# Patient Record
Sex: MALE | Race: WHITE | NOT HISPANIC OR LATINO | Employment: OTHER | URBAN - METROPOLITAN AREA
[De-identification: names, ages, dates, MRNs, and addresses within clinical notes are randomized per-mention and may not be internally consistent; named-entity substitution may affect disease eponyms.]

---

## 2022-01-07 ENCOUNTER — HISTORICAL (OUTPATIENT)
Dept: ADMINISTRATIVE | Facility: HOSPITAL | Age: 72
End: 2022-01-07

## 2022-01-07 LAB — POC CREATININE: 1.3 MG/DL (ref 0.6–1.3)

## 2022-12-30 ENCOUNTER — LAB REQUISITION (OUTPATIENT)
Dept: LAB | Facility: HOSPITAL | Age: 72
End: 2022-12-30
Payer: MEDICARE

## 2022-12-30 DIAGNOSIS — N39.0 URINARY TRACT INFECTION, SITE NOT SPECIFIED: ICD-10-CM

## 2022-12-30 PROCEDURE — 87088 URINE BACTERIA CULTURE: CPT | Performed by: NURSE PRACTITIONER

## 2023-01-01 LAB — BACTERIA UR CULT: NO GROWTH

## 2023-10-17 DIAGNOSIS — D36.9 TUBULOVILLOUS ADENOMA: Primary | ICD-10-CM

## 2023-10-25 ENCOUNTER — DOCUMENTATION ONLY (OUTPATIENT)
Dept: SURGICAL ONCOLOGY | Facility: CLINIC | Age: 73
End: 2023-10-25
Payer: MEDICARE

## 2023-10-25 LAB — CRC RECOMMENDATION EXT: NORMAL

## 2023-10-26 ENCOUNTER — OFFICE VISIT (OUTPATIENT)
Dept: SURGICAL ONCOLOGY | Facility: CLINIC | Age: 73
End: 2023-10-26
Payer: MEDICARE

## 2023-10-26 VITALS — WEIGHT: 223 LBS | HEIGHT: 68 IN | BODY MASS INDEX: 33.8 KG/M2

## 2023-10-26 DIAGNOSIS — D36.9 TUBULOVILLOUS ADENOMA: Primary | ICD-10-CM

## 2023-10-26 DIAGNOSIS — D36.9 TUBULOVILLOUS ADENOMA: ICD-10-CM

## 2023-10-26 PROCEDURE — 1160F PR REVIEW ALL MEDS BY PRESCRIBER/CLIN PHARMACIST DOCUMENTED: ICD-10-PCS | Mod: CPTII,S$GLB,, | Performed by: COLON & RECTAL SURGERY

## 2023-10-26 PROCEDURE — 1159F MED LIST DOCD IN RCRD: CPT | Mod: CPTII,S$GLB,, | Performed by: COLON & RECTAL SURGERY

## 2023-10-26 PROCEDURE — 3008F BODY MASS INDEX DOCD: CPT | Mod: CPTII,S$GLB,, | Performed by: COLON & RECTAL SURGERY

## 2023-10-26 PROCEDURE — 1159F PR MEDICATION LIST DOCUMENTED IN MEDICAL RECORD: ICD-10-PCS | Mod: CPTII,S$GLB,, | Performed by: COLON & RECTAL SURGERY

## 2023-10-26 PROCEDURE — 4010F PR ACE/ARB THEARPY RXD/TAKEN: ICD-10-PCS | Mod: CPTII,S$GLB,, | Performed by: COLON & RECTAL SURGERY

## 2023-10-26 PROCEDURE — 1101F PT FALLS ASSESS-DOCD LE1/YR: CPT | Mod: CPTII,S$GLB,, | Performed by: COLON & RECTAL SURGERY

## 2023-10-26 PROCEDURE — 99205 PR OFFICE/OUTPT VISIT, NEW, LEVL V, 60-74 MIN: ICD-10-PCS | Mod: S$GLB,,, | Performed by: COLON & RECTAL SURGERY

## 2023-10-26 PROCEDURE — 3288F PR FALLS RISK ASSESSMENT DOCUMENTED: ICD-10-PCS | Mod: CPTII,S$GLB,, | Performed by: COLON & RECTAL SURGERY

## 2023-10-26 PROCEDURE — 1160F RVW MEDS BY RX/DR IN RCRD: CPT | Mod: CPTII,S$GLB,, | Performed by: COLON & RECTAL SURGERY

## 2023-10-26 PROCEDURE — 99205 OFFICE O/P NEW HI 60 MIN: CPT | Mod: S$GLB,,, | Performed by: COLON & RECTAL SURGERY

## 2023-10-26 PROCEDURE — 3008F PR BODY MASS INDEX (BMI) DOCUMENTED: ICD-10-PCS | Mod: CPTII,S$GLB,, | Performed by: COLON & RECTAL SURGERY

## 2023-10-26 PROCEDURE — 3288F FALL RISK ASSESSMENT DOCD: CPT | Mod: CPTII,S$GLB,, | Performed by: COLON & RECTAL SURGERY

## 2023-10-26 PROCEDURE — 4010F ACE/ARB THERAPY RXD/TAKEN: CPT | Mod: CPTII,S$GLB,, | Performed by: COLON & RECTAL SURGERY

## 2023-10-26 PROCEDURE — 99999 PR PBB SHADOW E&M-EST. PATIENT-LVL III: ICD-10-PCS | Mod: PBBFAC,,, | Performed by: COLON & RECTAL SURGERY

## 2023-10-26 PROCEDURE — 1101F PR PT FALLS ASSESS DOC 0-1 FALLS W/OUT INJ PAST YR: ICD-10-PCS | Mod: CPTII,S$GLB,, | Performed by: COLON & RECTAL SURGERY

## 2023-10-26 PROCEDURE — 99999 PR PBB SHADOW E&M-EST. PATIENT-LVL III: CPT | Mod: PBBFAC,,, | Performed by: COLON & RECTAL SURGERY

## 2023-10-26 RX ORDER — TAMSULOSIN HYDROCHLORIDE 0.4 MG/1
1 CAPSULE ORAL DAILY
COMMUNITY
Start: 2023-10-12

## 2023-10-26 RX ORDER — GABAPENTIN 100 MG/1
200 CAPSULE ORAL
Status: CANCELLED | OUTPATIENT
Start: 2023-10-26

## 2023-10-26 RX ORDER — METHYLPREDNISOLONE 4 MG/1
TABLET ORAL
COMMUNITY
Start: 2023-08-16

## 2023-10-26 RX ORDER — NEOMYCIN SULFATE 500 MG/1
1000 TABLET ORAL 3 TIMES DAILY
Qty: 6 TABLET | Refills: 0 | Status: SHIPPED | OUTPATIENT
Start: 2023-10-26 | End: 2023-11-10 | Stop reason: SDUPTHER

## 2023-10-26 RX ORDER — SODIUM CHLORIDE, SODIUM LACTATE, POTASSIUM CHLORIDE, CALCIUM CHLORIDE 600; 310; 30; 20 MG/100ML; MG/100ML; MG/100ML; MG/100ML
INJECTION, SOLUTION INTRAVENOUS CONTINUOUS
Status: CANCELLED | OUTPATIENT
Start: 2023-10-26

## 2023-10-26 RX ORDER — LAMOTRIGINE 100 MG/1
100 TABLET ORAL DAILY
COMMUNITY
Start: 2023-09-09

## 2023-10-26 RX ORDER — LIDOCAINE HYDROCHLORIDE 10 MG/ML
1 INJECTION, SOLUTION EPIDURAL; INFILTRATION; INTRACAUDAL; PERINEURAL ONCE
Status: CANCELLED | OUTPATIENT
Start: 2023-10-26 | End: 2023-10-26

## 2023-10-26 RX ORDER — FEBUXOSTAT 40 MG/1
40 TABLET, FILM COATED ORAL
COMMUNITY

## 2023-10-26 RX ORDER — DULOXETIN HYDROCHLORIDE 60 MG/1
60 CAPSULE, DELAYED RELEASE ORAL DAILY
COMMUNITY
Start: 2023-09-09

## 2023-10-26 RX ORDER — ENALAPRIL MALEATE 20 MG/1
20 TABLET ORAL DAILY
COMMUNITY
Start: 2023-09-09

## 2023-10-26 RX ORDER — ACETAMINOPHEN 500 MG
1000 TABLET ORAL
Status: CANCELLED | OUTPATIENT
Start: 2023-10-26 | End: 2023-10-26

## 2023-10-26 RX ORDER — ENOXAPARIN SODIUM 300 MG/3ML
40 INJECTION INTRAVENOUS; SUBCUTANEOUS EVERY 24 HOURS
Status: CANCELLED | OUTPATIENT
Start: 2023-10-26

## 2023-10-26 RX ORDER — DULOXETIN HYDROCHLORIDE 30 MG/1
30 CAPSULE, DELAYED RELEASE ORAL DAILY
COMMUNITY
Start: 2023-09-09

## 2023-10-26 RX ORDER — CELECOXIB 100 MG/1
400 CAPSULE ORAL
Status: CANCELLED | OUTPATIENT
Start: 2023-10-26 | End: 2023-10-26

## 2023-10-26 RX ORDER — METRONIDAZOLE 500 MG/1
500 TABLET ORAL 3 TIMES DAILY
Qty: 3 TABLET | Refills: 0 | Status: ON HOLD | OUTPATIENT
Start: 2023-10-26 | End: 2023-11-14

## 2023-10-26 RX ORDER — METRONIDAZOLE 500 MG/100ML
500 INJECTION, SOLUTION INTRAVENOUS
Status: CANCELLED | OUTPATIENT
Start: 2023-10-26

## 2023-10-27 NOTE — PROGRESS NOTES
"   Patient ID: [id number]     Chief Complaint: tubulovillous adenoma of cecum (New pt)      HPI:     Kevin Hurley is a 73 y.o. male here today for an initial consultation.  He was referred by . Kelby Black for management of a colon polyp.  He has a history of colon polyps so he underwent surveillance colonoscopy 09/27/2023 that revealed a 4-5 cm nodular, carpet like polyp covering 2 folds in the cecum.  The polyp could not be removed endoscopically so biopsy was performed.  Pathology shows tubulovillous adenoma.  He denies abdominal pain, constipation, diarrhea, blood in his stool, and weight loss.    Past Medical History:  has a past medical history of Depression.    Surgical History:  has a past surgical history that includes Appendectomy and Rotator cuff repair (Right).    Family History: family history includes Cancer in his father.    Social History:  reports that he has never smoked. He has never used smokeless tobacco. He reports current alcohol use.    Current Outpatient Medications   Medication Instructions    DULoxetine (CYMBALTA) 60 mg, Oral    DULoxetine (CYMBALTA) 30 mg, Oral    enalapril (VASOTEC) 20 mg, Oral    febuxostat (ULORIC) 40 mg, Oral    lamoTRIgine (LAMICTAL) 100 mg, Oral, 2 times daily    methylPREDNISolone (MEDROL DOSEPACK) 4 mg tablet Oral    metroNIDAZOLE (FLAGYL) 500 mg, Oral, 3 times daily    neomycin (MYCIFRADIN) 1,000 mg, Oral, 3 times daily    tamsulosin (FLOMAX) 0.4 mg Cap 1 capsule, Oral       Patient is allergic to allopurinol analogues.     Patient Care Team:  Jez Christopher MD as PCP - General (Family Medicine)       Subjective:     Review of Systems    12 point review of systems conducted, negative except as stated in the history of present illness. See HPI for details.      Objective:     Visit Vitals  Ht 5' 8" (1.727 m)   Wt 101.2 kg (223 lb)   BMI 33.91 kg/m²       Physical Exam    General: Alert and oriented, No acute distress.  Head: Normocephalic, " "Atraumatic.  Eye: Pupils are equal and round, Extraocular movements are intact, Sclera non-icteric.  Ears/Nose/Throat: Normal, Mucosa moist, Clear.  Respiratory: No wheezes, Non-labored respirations, Symmetrical chest wall expansion.  Cardiovascular: Regular rate.  Gastrointestinal: Soft, Non-tender, Non-distended, Normal bowel sounds, No palpable masses.  Integumentary: Warm, Dry, Intact, No rashes.  Extremities: No edema.  Neurologic: No focal deficits.  Psychiatric: Normal interaction, Coherent speech, Euthymic mood, Appropriate affect.       Labs Reviewed:     Chemistry:  No results found for: "NA", "K", "CHLORIDE", "BUN", "CREATININE", "EGFRNORACEVR", "GLUCOSE", "CALCIUM", "ALKPHOS", "LABPROT", "ALBUMIN", "BILIDIR", "IBILI", "AST", "ALT", "MG", "PHOS", "CEA"     Hematology:  No results found for: "WBC", "HGB", "HCT", "PLT"      Assessment:       ICD-10-CM ICD-9-CM   1. Tubulovillous adenoma  D36.9 229.9        Plan:   I explained the colonoscopic and pathologic findings with the patient as well as the reason for surgery.  I also explained the surgery, potential adverse events, and expected recovery.  Schedule laparoscopic right colectomy 11/14/2023.      No follow-ups on file. In addition to their scheduled follow up, the patient has also been instructed to follow up on as needed basis.     Future Appointments   Date Time Provider Department Center   11/30/2023 11:30 AM Kevin Montesinos MD [facility]        Kevin Montesinos MD    "

## 2023-10-27 NOTE — H&P (VIEW-ONLY)
"   Patient ID: [id number]     Chief Complaint: tubulovillous adenoma of cecum (New pt)      HPI:     Kevin Hurley is a 73 y.o. male here today for an initial consultation.  He was referred by . Kelby Black for management of a colon polyp.  He has a history of colon polyps so he underwent surveillance colonoscopy 09/27/2023 that revealed a 4-5 cm nodular, carpet like polyp covering 2 folds in the cecum.  The polyp could not be removed endoscopically so biopsy was performed.  Pathology shows tubulovillous adenoma.  He denies abdominal pain, constipation, diarrhea, blood in his stool, and weight loss.    Past Medical History:  has a past medical history of Depression.    Surgical History:  has a past surgical history that includes Appendectomy and Rotator cuff repair (Right).    Family History: family history includes Cancer in his father.    Social History:  reports that he has never smoked. He has never used smokeless tobacco. He reports current alcohol use.    Current Outpatient Medications   Medication Instructions    DULoxetine (CYMBALTA) 60 mg, Oral    DULoxetine (CYMBALTA) 30 mg, Oral    enalapril (VASOTEC) 20 mg, Oral    febuxostat (ULORIC) 40 mg, Oral    lamoTRIgine (LAMICTAL) 100 mg, Oral, 2 times daily    methylPREDNISolone (MEDROL DOSEPACK) 4 mg tablet Oral    metroNIDAZOLE (FLAGYL) 500 mg, Oral, 3 times daily    neomycin (MYCIFRADIN) 1,000 mg, Oral, 3 times daily    tamsulosin (FLOMAX) 0.4 mg Cap 1 capsule, Oral       Patient is allergic to allopurinol analogues.     Patient Care Team:  Jez Christopher MD as PCP - General (Family Medicine)       Subjective:     Review of Systems    12 point review of systems conducted, negative except as stated in the history of present illness. See HPI for details.      Objective:     Visit Vitals  Ht 5' 8" (1.727 m)   Wt 101.2 kg (223 lb)   BMI 33.91 kg/m²       Physical Exam    General: Alert and oriented, No acute distress.  Head: Normocephalic, " "Atraumatic.  Eye: Pupils are equal and round, Extraocular movements are intact, Sclera non-icteric.  Ears/Nose/Throat: Normal, Mucosa moist, Clear.  Respiratory: No wheezes, Non-labored respirations, Symmetrical chest wall expansion.  Cardiovascular: Regular rate.  Gastrointestinal: Soft, Non-tender, Non-distended, Normal bowel sounds, No palpable masses.  Integumentary: Warm, Dry, Intact, No rashes.  Extremities: No edema.  Neurologic: No focal deficits.  Psychiatric: Normal interaction, Coherent speech, Euthymic mood, Appropriate affect.       Labs Reviewed:     Chemistry:  No results found for: "NA", "K", "CHLORIDE", "BUN", "CREATININE", "EGFRNORACEVR", "GLUCOSE", "CALCIUM", "ALKPHOS", "LABPROT", "ALBUMIN", "BILIDIR", "IBILI", "AST", "ALT", "MG", "PHOS", "CEA"     Hematology:  No results found for: "WBC", "HGB", "HCT", "PLT"      Assessment:       ICD-10-CM ICD-9-CM   1. Tubulovillous adenoma  D36.9 229.9        Plan:   I explained the colonoscopic and pathologic findings with the patient as well as the reason for surgery.  I also explained the surgery, potential adverse events, and expected recovery.  Schedule laparoscopic right colectomy 11/14/2023.      No follow-ups on file. In addition to their scheduled follow up, the patient has also been instructed to follow up on as needed basis.     Future Appointments   Date Time Provider Department Center   11/30/2023 11:30 AM Kevin Montesinos MD [facility]        Kevin Montesinos MD    "

## 2023-11-06 ENCOUNTER — HOSPITAL ENCOUNTER (OUTPATIENT)
Dept: RADIOLOGY | Facility: HOSPITAL | Age: 73
Discharge: HOME | End: 2023-11-06
Attending: COLON & RECTAL SURGERY
Payer: MEDICARE

## 2023-11-06 DIAGNOSIS — D36.9 TUBULOVILLOUS ADENOMA: ICD-10-CM

## 2023-11-06 PROCEDURE — 71046 X-RAY EXAM CHEST 2 VIEWS: CPT | Mod: TC

## 2023-11-10 DIAGNOSIS — D36.9 TUBULOVILLOUS ADENOMA: ICD-10-CM

## 2023-11-10 RX ORDER — NEOMYCIN SULFATE 500 MG/1
1000 TABLET ORAL 3 TIMES DAILY
Qty: 6 TABLET | Refills: 0 | Status: ON HOLD | OUTPATIENT
Start: 2023-11-10 | End: 2023-11-14

## 2023-11-13 ENCOUNTER — ANESTHESIA EVENT (OUTPATIENT)
Dept: SURGERY | Facility: HOSPITAL | Age: 73
DRG: 331 | End: 2023-11-13
Payer: MEDICARE

## 2023-11-13 NOTE — PRE-PROCEDURE INSTRUCTIONS
[facility]: Outpatient Surgery   Preprocedure Check-In Instructions       Your arrival time for your surgery or procedure is 5:00am.     We ask patients to arrive about 2 hours before surgery to allow for enough time to review your health history & medications, start your IV, complete any outstanding labwork or tests, and meet your Anesthesiologist.     You will arrive at [facility], [address] [place] [address]. Enter through the [place] entrance next to the Emergency Room, and come to the 6th floor to the Outpatient Surgery Department. If you need a wheelchair, please call [phone] for an attendant to meet you at the [place] entrance with a wheelchair.    Wait Times:  Due to inconsistent procedure completion times, an unexpected wait may occur.  The physicians would like you to be here in the event they run ahead of time.  We will keep you comfortable and informed while you are waiting.  We apologize in advance if this happens.    Visitory Policy:   You are allowed 2 adult visitors to be with you in the hospital. All hospital visitors should be in good current health. No small children.     What to Bring:   Please have your ID, insurance cards, and all home medication bottles with you at check in. Bring your CPAP machine if one is used at home.     Fasting:   Nothing to eat or drink after midnight the night before your procedure. This includes no ice, gum, hard candies, and/or tobacco products.   Follow your doctor's instructions for taking any medications on the morning of your procedure. If no instructions for taking medications were given, do not take any medications but bring your medications in their bottles to your procedure check in.     Follow your doctor's preoperative instructions regarding skin prep, bowel prep, bathing, or showering prior to your procedure. If any special soaps were provided to you, please use according to your doctor's instructions.  If no instructions were given from your doctor, take a good bath or shower with antibacterial soap the night before and the morning of your procedure. On the morning of procedure, wear loose, comfortable clothing. No lotions, makeup, perfumes, colognes, deodorant, or jewelry to your procedure. Removable items (glasses, contact lenses, dentures, retainers, hearing aids) need to be removed for your procedure. Bring your storage containers for these items if you wear them.     Artificial nails, body jewelry, eyelash extensions, and/or hair extensions with metal clips are not allowed during your surgery. If you currently wear any of these items, please arrange for them to be removed prior to your arrival to the hospital.     Outpatient or Same Day Surgeries:   Any patients receiving sedation/anesthesia are advised not to drive for 24 hours after their procedure. We do not allow patients to drive themselves home after discharge. If you are going home after your procedure, please have someone available to drive you home from the hospital.     You may call the Outpatient Surgery Department at [phone] with any questions or concerns. We are looking forward to meeting you and taking great care of you for your procedure. Thank you for choosing [facility] for your surgical needs.       Status: complete  Spoke with: patient  Call Time: [id number]

## 2023-11-14 ENCOUNTER — ANESTHESIA (OUTPATIENT)
Dept: SURGERY | Facility: HOSPITAL | Age: 73
DRG: 331 | End: 2023-11-14
Payer: MEDICARE

## 2023-11-14 ENCOUNTER — HOSPITAL ENCOUNTER (INPATIENT)
Facility: HOSPITAL | Age: 73
LOS: 2 days | Discharge: HOME | DRG: 331 | End: 2023-11-16
Attending: COLON & RECTAL SURGERY | Admitting: COLON & RECTAL SURGERY
Payer: MEDICARE

## 2023-11-14 DIAGNOSIS — D36.9 TUBULOVILLOUS ADENOMA: ICD-10-CM

## 2023-11-14 DIAGNOSIS — K63.5 CECAL POLYP: Primary | ICD-10-CM

## 2023-11-14 LAB
GROUP & RH: NORMAL
INDIRECT COOMBS GEL: NORMAL
SPECIMEN OUTDATE: NORMAL

## 2023-11-14 PROCEDURE — 25000003 PHARM REV CODE 250: Performed by: ANESTHESIOLOGY

## 2023-11-14 PROCEDURE — 63600175 PHARM REV CODE 636 W HCPCS: Performed by: ANESTHESIOLOGY

## 2023-11-14 PROCEDURE — 11000001 HC ACUTE MED/SURG PRIVATE ROOM

## 2023-11-14 PROCEDURE — 63600175 PHARM REV CODE 636 W HCPCS: Performed by: COLON & RECTAL SURGERY

## 2023-11-14 PROCEDURE — 36000711: Performed by: COLON & RECTAL SURGERY

## 2023-11-14 PROCEDURE — 37000008 HC ANESTHESIA 1ST 15 MINUTES: Performed by: COLON & RECTAL SURGERY

## 2023-11-14 PROCEDURE — 71000033 HC RECOVERY, INTIAL HOUR: Performed by: COLON & RECTAL SURGERY

## 2023-11-14 PROCEDURE — D9220A PRA ANESTHESIA: ICD-10-PCS | Mod: CRNA,,, | Performed by: NURSE ANESTHETIST, CERTIFIED REGISTERED

## 2023-11-14 PROCEDURE — D9220A PRA ANESTHESIA: Mod: ANES,,, | Performed by: ANESTHESIOLOGY

## 2023-11-14 PROCEDURE — C9290 INJ, BUPIVACAINE LIPOSOME: HCPCS | Performed by: COLON & RECTAL SURGERY

## 2023-11-14 PROCEDURE — 25000003 PHARM REV CODE 250: Performed by: COLON & RECTAL SURGERY

## 2023-11-14 PROCEDURE — 44205 PR LAP,SURG,COLECTOMY,W/REMVL TERM ILEUM: ICD-10-PCS | Mod: ,,, | Performed by: COLON & RECTAL SURGERY

## 2023-11-14 PROCEDURE — 88307 TISSUE EXAM BY PATHOLOGIST: CPT | Performed by: COLON & RECTAL SURGERY

## 2023-11-14 PROCEDURE — D9220A PRA ANESTHESIA: ICD-10-PCS | Mod: ANES,,, | Performed by: ANESTHESIOLOGY

## 2023-11-14 PROCEDURE — 44205 LAP COLECTOMY PART W/ILEUM: CPT | Mod: ,,, | Performed by: COLON & RECTAL SURGERY

## 2023-11-14 PROCEDURE — 71000039 HC RECOVERY, EACH ADD'L HOUR: Performed by: COLON & RECTAL SURGERY

## 2023-11-14 PROCEDURE — 37000009 HC ANESTHESIA EA ADD 15 MINS: Performed by: COLON & RECTAL SURGERY

## 2023-11-14 PROCEDURE — 71000015 HC POSTOP RECOV 1ST HR: Performed by: COLON & RECTAL SURGERY

## 2023-11-14 PROCEDURE — 27000221 HC OXYGEN, UP TO 24 HOURS

## 2023-11-14 PROCEDURE — 25000003 PHARM REV CODE 250: Performed by: NURSE ANESTHETIST, CERTIFIED REGISTERED

## 2023-11-14 PROCEDURE — D9220A PRA ANESTHESIA: Mod: CRNA,,, | Performed by: NURSE ANESTHETIST, CERTIFIED REGISTERED

## 2023-11-14 PROCEDURE — 36000710: Performed by: COLON & RECTAL SURGERY

## 2023-11-14 PROCEDURE — 27201423 OPTIME MED/SURG SUP & DEVICES STERILE SUPPLY: Performed by: COLON & RECTAL SURGERY

## 2023-11-14 PROCEDURE — 63600175 PHARM REV CODE 636 W HCPCS: Performed by: NURSE ANESTHETIST, CERTIFIED REGISTERED

## 2023-11-14 PROCEDURE — 86850 RBC ANTIBODY SCREEN: CPT | Performed by: COLON & RECTAL SURGERY

## 2023-11-14 RX ORDER — ENOXAPARIN SODIUM 100 MG/ML
40 INJECTION SUBCUTANEOUS EVERY 24 HOURS
Status: DISCONTINUED | OUTPATIENT
Start: 2023-11-14 | End: 2023-11-14

## 2023-11-14 RX ORDER — HYDROMORPHONE HYDROCHLORIDE 2 MG/ML
INJECTION, SOLUTION INTRAMUSCULAR; INTRAVENOUS; SUBCUTANEOUS
Status: DISCONTINUED | OUTPATIENT
Start: 2023-11-14 | End: 2023-11-14

## 2023-11-14 RX ORDER — DEXAMETHASONE SODIUM PHOSPHATE 4 MG/ML
INJECTION, SOLUTION INTRA-ARTICULAR; INTRALESIONAL; INTRAMUSCULAR; INTRAVENOUS; SOFT TISSUE
Status: DISCONTINUED | OUTPATIENT
Start: 2023-11-14 | End: 2023-11-14

## 2023-11-14 RX ORDER — LABETALOL HYDROCHLORIDE 5 MG/ML
INJECTION, SOLUTION INTRAVENOUS ONCE
Status: CANCELLED | OUTPATIENT
Start: 2023-11-14 | End: 2023-11-14

## 2023-11-14 RX ORDER — PROCHLORPERAZINE EDISYLATE 5 MG/ML
5 INJECTION INTRAMUSCULAR; INTRAVENOUS EVERY 6 HOURS PRN
Status: DISCONTINUED | OUTPATIENT
Start: 2023-11-14 | End: 2023-11-16 | Stop reason: HOSPADM

## 2023-11-14 RX ORDER — MUPIROCIN 20 MG/G
OINTMENT TOPICAL 2 TIMES DAILY
Status: DISCONTINUED | OUTPATIENT
Start: 2023-11-14 | End: 2023-11-16 | Stop reason: HOSPADM

## 2023-11-14 RX ORDER — FENTANYL CITRATE 50 UG/ML
INJECTION, SOLUTION INTRAMUSCULAR; INTRAVENOUS
Status: DISCONTINUED | OUTPATIENT
Start: 2023-11-14 | End: 2023-11-14

## 2023-11-14 RX ORDER — ONDANSETRON 2 MG/ML
INJECTION INTRAMUSCULAR; INTRAVENOUS
Status: DISCONTINUED | OUTPATIENT
Start: 2023-11-14 | End: 2023-11-14

## 2023-11-14 RX ORDER — DULOXETIN HYDROCHLORIDE 30 MG/1
60 CAPSULE, DELAYED RELEASE ORAL DAILY
Status: DISCONTINUED | OUTPATIENT
Start: 2023-11-14 | End: 2023-11-16 | Stop reason: HOSPADM

## 2023-11-14 RX ORDER — SODIUM CHLORIDE, SODIUM LACTATE, POTASSIUM CHLORIDE, CALCIUM CHLORIDE 600; 310; 30; 20 MG/100ML; MG/100ML; MG/100ML; MG/100ML
INJECTION, SOLUTION INTRAVENOUS CONTINUOUS
Status: DISCONTINUED | OUTPATIENT
Start: 2023-11-14 | End: 2023-11-14

## 2023-11-14 RX ORDER — METOCLOPRAMIDE HYDROCHLORIDE 5 MG/ML
10 INJECTION INTRAMUSCULAR; INTRAVENOUS EVERY 10 MIN PRN
Status: DISCONTINUED | OUTPATIENT
Start: 2023-11-14 | End: 2023-11-14 | Stop reason: HOSPADM

## 2023-11-14 RX ORDER — HYDROMORPHONE HYDROCHLORIDE 2 MG/ML
0.4 INJECTION, SOLUTION INTRAMUSCULAR; INTRAVENOUS; SUBCUTANEOUS EVERY 5 MIN PRN
Status: DISCONTINUED | OUTPATIENT
Start: 2023-11-14 | End: 2023-11-14 | Stop reason: HOSPADM

## 2023-11-14 RX ORDER — MIDAZOLAM HYDROCHLORIDE 1 MG/ML
INJECTION INTRAMUSCULAR; INTRAVENOUS
Status: DISCONTINUED | OUTPATIENT
Start: 2023-11-14 | End: 2023-11-14

## 2023-11-14 RX ORDER — SODIUM CHLORIDE, SODIUM LACTATE, POTASSIUM CHLORIDE, CALCIUM CHLORIDE 600; 310; 30; 20 MG/100ML; MG/100ML; MG/100ML; MG/100ML
INJECTION, SOLUTION INTRAVENOUS CONTINUOUS
Status: DISCONTINUED | OUTPATIENT
Start: 2023-11-14 | End: 2023-11-16

## 2023-11-14 RX ORDER — MIDAZOLAM HYDROCHLORIDE 1 MG/ML
2 INJECTION INTRAMUSCULAR; INTRAVENOUS ONCE AS NEEDED
Status: CANCELLED | OUTPATIENT
Start: 2023-11-14 | End: 2035-04-11

## 2023-11-14 RX ORDER — GLYCOPYRROLATE 0.2 MG/ML
INJECTION INTRAMUSCULAR; INTRAVENOUS
Status: DISCONTINUED | OUTPATIENT
Start: 2023-11-14 | End: 2023-11-14

## 2023-11-14 RX ORDER — BUPIVACAINE HYDROCHLORIDE 5 MG/ML
INJECTION, SOLUTION EPIDURAL; INTRACAUDAL
Status: DISCONTINUED | OUTPATIENT
Start: 2023-11-14 | End: 2023-11-14 | Stop reason: HOSPADM

## 2023-11-14 RX ORDER — LIDOCAINE HYDROCHLORIDE 10 MG/ML
1 INJECTION, SOLUTION EPIDURAL; INFILTRATION; INTRACAUDAL; PERINEURAL ONCE
Status: CANCELLED | OUTPATIENT
Start: 2023-11-14 | End: 2023-11-14

## 2023-11-14 RX ORDER — PROPOFOL 10 MG/ML
VIAL (ML) INTRAVENOUS
Status: DISCONTINUED | OUTPATIENT
Start: 2023-11-14 | End: 2023-11-14

## 2023-11-14 RX ORDER — ONDANSETRON 2 MG/ML
4 INJECTION INTRAMUSCULAR; INTRAVENOUS DAILY PRN
Status: DISCONTINUED | OUTPATIENT
Start: 2023-11-14 | End: 2023-11-14 | Stop reason: HOSPADM

## 2023-11-14 RX ORDER — GABAPENTIN 100 MG/1
200 CAPSULE ORAL
Status: COMPLETED | OUTPATIENT
Start: 2023-11-14 | End: 2023-11-14

## 2023-11-14 RX ORDER — LIDOCAINE HYDROCHLORIDE 10 MG/ML
1 INJECTION, SOLUTION EPIDURAL; INFILTRATION; INTRACAUDAL; PERINEURAL ONCE
Status: DISCONTINUED | OUTPATIENT
Start: 2023-11-14 | End: 2023-11-14

## 2023-11-14 RX ORDER — OXYCODONE HYDROCHLORIDE 5 MG/1
5 TABLET ORAL EVERY 4 HOURS PRN
Status: DISCONTINUED | OUTPATIENT
Start: 2023-11-14 | End: 2023-11-16 | Stop reason: HOSPADM

## 2023-11-14 RX ORDER — KETOROLAC TROMETHAMINE 30 MG/ML
15 INJECTION, SOLUTION INTRAMUSCULAR; INTRAVENOUS EVERY 6 HOURS
Status: DISCONTINUED | OUTPATIENT
Start: 2023-11-14 | End: 2023-11-16 | Stop reason: HOSPADM

## 2023-11-14 RX ORDER — PHENYLEPHRINE HYDROCHLORIDE 10 MG/ML
INJECTION INTRAVENOUS
Status: DISCONTINUED | OUTPATIENT
Start: 2023-11-14 | End: 2023-11-14

## 2023-11-14 RX ORDER — DIPHENHYDRAMINE HYDROCHLORIDE 50 MG/ML
25 INJECTION INTRAMUSCULAR; INTRAVENOUS EVERY 6 HOURS PRN
Status: DISCONTINUED | OUTPATIENT
Start: 2023-11-14 | End: 2023-11-14 | Stop reason: HOSPADM

## 2023-11-14 RX ORDER — ONDANSETRON 4 MG/1
4 TABLET, ORALLY DISINTEGRATING ORAL ONCE
Status: COMPLETED | OUTPATIENT
Start: 2023-11-14 | End: 2023-11-14

## 2023-11-14 RX ORDER — ONDANSETRON 2 MG/ML
4 INJECTION INTRAMUSCULAR; INTRAVENOUS EVERY 8 HOURS PRN
Status: DISCONTINUED | OUTPATIENT
Start: 2023-11-14 | End: 2023-11-16 | Stop reason: HOSPADM

## 2023-11-14 RX ORDER — SODIUM CHLORIDE, SODIUM GLUCONATE, SODIUM ACETATE, POTASSIUM CHLORIDE AND MAGNESIUM CHLORIDE 30; 37; 368; 526; 502 MG/100ML; MG/100ML; MG/100ML; MG/100ML; MG/100ML
INJECTION, SOLUTION INTRAVENOUS CONTINUOUS
Status: DISCONTINUED | OUTPATIENT
Start: 2023-11-14 | End: 2023-11-14

## 2023-11-14 RX ORDER — CELECOXIB 200 MG/1
400 CAPSULE ORAL
Status: COMPLETED | OUTPATIENT
Start: 2023-11-14 | End: 2023-11-14

## 2023-11-14 RX ORDER — ROCURONIUM BROMIDE 10 MG/ML
INJECTION, SOLUTION INTRAVENOUS
Status: DISCONTINUED | OUTPATIENT
Start: 2023-11-14 | End: 2023-11-14

## 2023-11-14 RX ORDER — TAMSULOSIN HYDROCHLORIDE 0.4 MG/1
1 CAPSULE ORAL DAILY
Status: DISCONTINUED | OUTPATIENT
Start: 2023-11-14 | End: 2023-11-16 | Stop reason: HOSPADM

## 2023-11-14 RX ORDER — LABETALOL HYDROCHLORIDE 5 MG/ML
10 INJECTION, SOLUTION INTRAVENOUS EVERY 4 HOURS PRN
Status: DISCONTINUED | OUTPATIENT
Start: 2023-11-14 | End: 2023-11-16 | Stop reason: HOSPADM

## 2023-11-14 RX ORDER — GABAPENTIN 300 MG/1
300 CAPSULE ORAL
Status: CANCELLED | OUTPATIENT
Start: 2023-11-14 | End: 2023-11-14

## 2023-11-14 RX ORDER — ACETAMINOPHEN 500 MG
1000 TABLET ORAL
Status: CANCELLED | OUTPATIENT
Start: 2023-11-14 | End: 2023-11-14

## 2023-11-14 RX ORDER — MORPHINE SULFATE 10 MG/ML
2 INJECTION INTRAMUSCULAR; INTRAVENOUS; SUBCUTANEOUS EVERY 4 HOURS PRN
Status: DISCONTINUED | OUTPATIENT
Start: 2023-11-14 | End: 2023-11-16 | Stop reason: HOSPADM

## 2023-11-14 RX ORDER — LAMOTRIGINE 100 MG/1
100 TABLET ORAL DAILY
Status: DISCONTINUED | OUTPATIENT
Start: 2023-11-14 | End: 2023-11-16 | Stop reason: HOSPADM

## 2023-11-14 RX ORDER — ACETAMINOPHEN 500 MG
1000 TABLET ORAL
Status: COMPLETED | OUTPATIENT
Start: 2023-11-14 | End: 2023-11-14

## 2023-11-14 RX ADMIN — HYDROMORPHONE HYDROCHLORIDE 0.4 MG: 2 INJECTION, SOLUTION INTRAMUSCULAR; INTRAVENOUS; SUBCUTANEOUS at 09:11

## 2023-11-14 RX ADMIN — PHENYLEPHRINE HYDROCHLORIDE 100 MCG: 10 INJECTION INTRAVENOUS at 08:11

## 2023-11-14 RX ADMIN — SODIUM CHLORIDE, SODIUM GLUCONATE, SODIUM ACETATE, POTASSIUM CHLORIDE AND MAGNESIUM CHLORIDE: 526; 502; 368; 37; 30 INJECTION, SOLUTION INTRAVENOUS at 07:11

## 2023-11-14 RX ADMIN — DEXAMETHASONE SODIUM PHOSPHATE 8 MG: 4 INJECTION, SOLUTION INTRA-ARTICULAR; INTRALESIONAL; INTRAMUSCULAR; INTRAVENOUS; SOFT TISSUE at 07:11

## 2023-11-14 RX ADMIN — CELECOXIB 400 MG: 200 CAPSULE ORAL at 06:11

## 2023-11-14 RX ADMIN — GLYCOPYRROLATE 0.1 MG: 0.2 INJECTION INTRAMUSCULAR; INTRAVENOUS at 07:11

## 2023-11-14 RX ADMIN — KETOROLAC TROMETHAMINE 15 MG: 30 INJECTION, SOLUTION INTRAMUSCULAR; INTRAVENOUS at 05:11

## 2023-11-14 RX ADMIN — KETOROLAC TROMETHAMINE 15 MG: 30 INJECTION, SOLUTION INTRAMUSCULAR; INTRAVENOUS at 01:11

## 2023-11-14 RX ADMIN — FENTANYL CITRATE 100 MCG: 50 INJECTION, SOLUTION INTRAMUSCULAR; INTRAVENOUS at 07:11

## 2023-11-14 RX ADMIN — SODIUM CHLORIDE, POTASSIUM CHLORIDE, SODIUM LACTATE AND CALCIUM CHLORIDE: 600; 310; 30; 20 INJECTION, SOLUTION INTRAVENOUS at 04:11

## 2023-11-14 RX ADMIN — ONDANSETRON 4 MG: 2 INJECTION INTRAMUSCULAR; INTRAVENOUS at 08:11

## 2023-11-14 RX ADMIN — CEFTRIAXONE SODIUM 2 G: 2 INJECTION, POWDER, FOR SOLUTION INTRAMUSCULAR; INTRAVENOUS at 07:11

## 2023-11-14 RX ADMIN — ONDANSETRON 4 MG: 4 TABLET, ORALLY DISINTEGRATING ORAL at 06:11

## 2023-11-14 RX ADMIN — SODIUM CHLORIDE, POTASSIUM CHLORIDE, SODIUM LACTATE AND CALCIUM CHLORIDE: 600; 310; 30; 20 INJECTION, SOLUTION INTRAVENOUS at 05:11

## 2023-11-14 RX ADMIN — LABETALOL HYDROCHLORIDE 10 MG: 5 INJECTION, SOLUTION INTRAVENOUS at 05:11

## 2023-11-14 RX ADMIN — HYDROMORPHONE HYDROCHLORIDE 0.4 MG: 2 INJECTION INTRAMUSCULAR; INTRAVENOUS; SUBCUTANEOUS at 10:11

## 2023-11-14 RX ADMIN — ENOXAPARIN SODIUM 40 MG: 40 INJECTION SUBCUTANEOUS at 06:11

## 2023-11-14 RX ADMIN — HYDROMORPHONE HYDROCHLORIDE 0.4 MG: 2 INJECTION INTRAMUSCULAR; INTRAVENOUS; SUBCUTANEOUS at 11:11

## 2023-11-14 RX ADMIN — ACETAMINOPHEN 1000 MG: 500 TABLET, FILM COATED ORAL at 06:11

## 2023-11-14 RX ADMIN — ROCURONIUM BROMIDE 80 MG: 10 SOLUTION INTRAVENOUS at 07:11

## 2023-11-14 RX ADMIN — MIDAZOLAM HYDROCHLORIDE 2 MG: 1 INJECTION, SOLUTION INTRAMUSCULAR; INTRAVENOUS at 07:11

## 2023-11-14 RX ADMIN — SUGAMMADEX 200 MG: 100 INJECTION, SOLUTION INTRAVENOUS at 09:11

## 2023-11-14 RX ADMIN — PROPOFOL 200 MG: 10 INJECTION, EMULSION INTRAVENOUS at 07:11

## 2023-11-14 RX ADMIN — MUPIROCIN: 20 OINTMENT TOPICAL at 08:11

## 2023-11-14 RX ADMIN — GABAPENTIN 200 MG: 100 CAPSULE ORAL at 06:11

## 2023-11-14 RX ADMIN — KETOROLAC TROMETHAMINE 15 MG: 30 INJECTION, SOLUTION INTRAMUSCULAR; INTRAVENOUS at 11:11

## 2023-11-14 NOTE — NURSING
Nurses Note -- 4 Eyes      11/14/2023   5:32 PM      Skin assessed during: Admit      [] No Altered Skin Integrity Present    []Prevention Measures Documented      [] Yes- Altered Skin Integrity Present or Discovered   [] LDA Added if Not in Epic (Describe Wound)   [] New Altered Skin Integrity was Present on Admit and Documented in LDA   [] Wound Image Taken    Wound Care Consulted? No    Attending Nurse:  Erma king lpn      Second RN/Staff Member:   juanita ospina lpn

## 2023-11-14 NOTE — ANESTHESIA PROCEDURE NOTES
Intubation    Date/Time: 11/14/2023 7:27 AM    Performed by: Marlon Naranjo CRNA  Authorized by: Miguel Angel Carter MD    Intubation:     Induction:  Intravenous    Intubated:  Postinduction    Mask Ventilation:  Easy with oral airway    Attempts:  1    Attempted By:  CRNA    Method of Intubation:  Direct    Blade:  Talbert 2    Laryngeal View Grade: Grade IIA - cords partially seen      Difficult Airway Encountered?: No      Complications:  None    Airway Device:  Oral endotracheal tube    Airway Device Size:  7.5    Style/Cuff Inflation:  Cuffed    Tube secured:  21    Secured at:  The lips    Placement Verified By:  Capnometry    Complicating Factors:  None    Findings Post-Intubation:  BS equal bilateral

## 2023-11-14 NOTE — ANESTHESIA PREPROCEDURE EVALUATION
11/14/2023  Kevin Hurley is a 73 y.o., male who presents with Colon Cncer, Tubulovilllous adenoma.      Diagnosis: Tubulovillous adenoma [D36.9]   Pre-op diagnosis: Tubulovillous adenoma [D36.9         The pt. comes to [facility] for the noted procedure under  GETA with 2nd peripheral IV.  Procedure: COLECTOMY, RIGHT, LAPAROSCOPIC (Abdomen)   Anesthesia type: General       PMHx:  Other Medical History   Depression Obesity   Tubulovillous adenoma Hypertension   Arthritis History of prostate cancer     Surgical History:  APPENDECTOMY ROTATOR CUFF REPAIR   COLONOSCOPY          Vital signs:        Lab Data:      EKG:          Pre-op Assessment    I have reviewed the Patient Summary Reports.     I have reviewed the Nursing Notes. I have reviewed the NPO Status.   I have reviewed the Medications.     Review of Systems  Anesthesia Hx:  No problems with previous Anesthesia                Social:  Non-Smoker       Hematology/Oncology:  Hematology Normal   Oncology Normal                                   EENT/Dental:  EENT/Dental Normal           Cardiovascular:  Exercise tolerance: good   Hypertension                Functional Capacity good / => 4 METS                         Pulmonary:  Pulmonary Normal                       Renal/:  Renal/ Normal                 Hepatic/GI:  Hepatic/GI Normal                 Musculoskeletal:  Musculoskeletal Normal                Neurological:  Neurology Normal                                      Endocrine:  Endocrine Normal            Dermatological:  Skin Normal    Psych:  Psychiatric History  depression                Physical Exam  General: Alert, Oriented, Well nourished and Cooperative    Airway:  Mallampati: II   Mouth Opening: Normal  TM Distance: Normal  Tongue: Normal  Neck ROM: Normal ROM    Dental:  Intact    Chest/Lungs:  Clear to auscultation, Normal Respiratory  Rate    Heart:  Rate: Normal  Rhythm: Regular Rhythm        Anesthesia Plan  Type of Anesthesia, risks & benefits discussed:    Anesthesia Type: Gen ETT  Intra-op Monitoring Plan: Standard ASA Monitors  Post Op Pain Control Plan: IV/PO Opioids PRN  Induction:  IV and Inhalation  Airway Plan: Direct  Informed Consent: Informed consent signed with the Patient and all parties understand the risks and agree with anesthesia plan.  All questions answered. Patient consented to blood products? Yes  ASA Score: 3  Day of Surgery Review of History & Physical: H&P Update referred to the surgeon/provider.  Anesthesia Plan Notes: '2 peripheral IV's    Ready For Surgery From Anesthesia Perspective.     .

## 2023-11-14 NOTE — TRANSFER OF CARE
"Anesthesia Transfer of Care Note    Patient: Kevin Hurley    Procedure(s) Performed: Procedure(s) (LRB):  COLECTOMY, RIGHT, LAPAROSCOPIC (N/A)    Patient location: PACU    Anesthesia Type: general    Transport from OR: Transported from OR on room air with adequate spontaneous ventilation    Post pain: adequate analgesia    Post assessment: no apparent anesthetic complications    Post vital signs: stable    Level of consciousness: sedated    Nausea/Vomiting: no nausea/vomiting    Complications: none    Transfer of care protocol was followed      Last vitals: Visit Vitals  BP (!) 163/85   Pulse 67   Temp 36.5 °C (97.7 °F) (Oral)   Resp 20   Ht 5' 8" (1.727 m)   Wt 101.2 kg (223 lb)   SpO2 96%   BMI 33.91 kg/m²     "

## 2023-11-14 NOTE — OP NOTE
[facility] - Periop Services  Operative Note      Date of Procedure: 11/14/2023     Procedure: Procedure(s) (LRB):  COLECTOMY, RIGHT, LAPAROSCOPIC (N/A)     Surgeon(s) and Role:     * Kevin Montesinos MD - Primary    Assisting Surgeon: None    Pre-Operative Diagnosis: Tubulovillous adenoma [D36.9]    Post-Operative Diagnosis: Same    Anesthesia: General    Estimated Blood Loss (EBL): 50 mL           Specimens: Right colon     Findings:  Curative intent - yes  Tumor location - cecum  Extent of vascular resection - ileocolic    Description of Technical Procedures:  After informed consent was obtained, patient was brought to the operating room and placed in the supine position.  Next general endotracheal anesthesia was administered by the anesthesia team.  The abdomen was prepped and draped in sterile surgical fashion.  A small vertical supraumbilical incision made with a 15 blade.  Access to the peritoneal cavity was achieved with an 11 mm Optiview trocar and a 10 mm 0 degree laparoscope.  Pneumoperitoneum was achieved.  Three additional working trocars were placed.  Omentum was then reflected cephalad.  Patient was placed in left-side-down position.  Cecum was grasped retracted towards the anterior abdominal wall thus delineating the ileocolic vascular pedicle.  The peritoneum was incised parallel to the pedicle and the right colon was mobilized in a medial to lateral fashion.  Duodenum was identified and kept out of harm's way.  The pedicle was skeletonized and isolated.  It was then sharply divided using a 60 mm echelon stapler with a white cartridge.  The mesentery was then divided up to the proximal transverse colon.  Omentum was incised off of the transverse colon.  The hepatic flexure was taken down with sharp dissection using Harmonic scalpel.  Attention was then turned to the cecum where the right colon was mobilized by incising the lateral attachment working along the white line of Toldt.   The medial dissection was then joined, and the right colon was completely mobilized.  Cecum was grasped with a laparoscopic grasper.  The supraumbilical incision was extended a short distance with a 15 blade.  Incision was deepened electrocautery.  The fascia was divided vertically in the midline and a medium Fabrizio wound edge protector was utilized.  The terminal ileum and right colon were then easily delivered into the wound.  Terminal ileum was divided approximally 5 cm proximal to the ileocecal valve using the Wh[place]any stapler with a blue cartridge.  Proximal transverse colon was divided similarly.  Remaining mesentery was divided with Harmonic scalpel.  The right colon was then sent for permanent pathology.  Then a side-to-side, functional end-to-end ileocolostomy was created using a 75 mm TLC stapler with blue cartridges.  The anastomosis was reinforced with interrupted 2-0 Vicryl Lembert suture.  A widely patent anastomosis was palpable upon completion.  It was returned to an intra-abdominal position.  Pneumoperitoneum was reachieved.  The anastomosis was properly oriented and tension free.  Bowels were covered with omentum.  Pneumoperitoneum was released and working trocars were removed.  The Fabrizio was removed and we changed gloves.  The midline fascial defect was repaired with running looped PDS suture.  Skin edges were reapproximated with Monocryl suture in a subcuticular fashion.  Incisions were cleaned and sterile bandages applied.  Patient tolerated procedure well and there were no complications.  Patient was awaken and extubated in the operating room then subsequently transferred to recovery in satisfactory condition.

## 2023-11-14 NOTE — INTERVAL H&P NOTE
The patient has been examined and the H&P has been reviewed:    I concur with the findings and no changes have occurred since H&P was written.    Surgery risks, benefits and alternative options discussed and understood by patient/family.          Active Hospital Problems    Diagnosis  POA    *Cecal polyp [K63.5]  Yes      Resolved Hospital Problems   No resolved problems to display.

## 2023-11-14 NOTE — NURSING
Pt arrived to room, family at side times one hr 110 bp 158/99 notified md jimenes     Pt is noted to be in stable condition with no s/s of hypertension     Dressings and lab sites to abd times four are noted to be dry and intact with steri strips in place     No pain reported at this time

## 2023-11-15 LAB
ANION GAP SERPL CALC-SCNC: 6 MEQ/L
BASOPHILS # BLD AUTO: 0.01 X10(3)/MCL
BASOPHILS NFR BLD AUTO: 0.1 %
BUN SERPL-MCNC: 18.6 MG/DL (ref 8.4–25.7)
CALCIUM SERPL-MCNC: 8.4 MG/DL (ref 8.8–10)
CHLORIDE SERPL-SCNC: 104 MMOL/L (ref 98–107)
CO2 SERPL-SCNC: 26 MMOL/L (ref 23–31)
CREAT SERPL-MCNC: 1.39 MG/DL (ref 0.73–1.18)
CREAT/UREA NIT SERPL: 13
EOSINOPHIL # BLD AUTO: 0.01 X10(3)/MCL (ref 0–0.9)
EOSINOPHIL NFR BLD AUTO: 0.1 %
ERYTHROCYTE [DISTWIDTH] IN BLOOD BY AUTOMATED COUNT: 12.1 % (ref 11.5–17)
GFR SERPLBLD CREATININE-BSD FMLA CKD-EPI: 53 MLS/MIN/1.73/M2
GLUCOSE SERPL-MCNC: 118 MG/DL (ref 82–115)
HCT VFR BLD AUTO: 34.4 % (ref 42–52)
HGB BLD-MCNC: 12.5 G/DL (ref 14–18)
IMM GRANULOCYTES # BLD AUTO: 0.03 X10(3)/MCL (ref 0–0.04)
IMM GRANULOCYTES NFR BLD AUTO: 0.3 %
LYMPHOCYTES # BLD AUTO: 0.85 X10(3)/MCL (ref 0.6–4.6)
LYMPHOCYTES NFR BLD AUTO: 9.4 %
MCH RBC QN AUTO: 35.6 PG (ref 27–31)
MCHC RBC AUTO-ENTMCNC: 36.3 G/DL (ref 33–36)
MCV RBC AUTO: 98 FL (ref 80–94)
MONOCYTES # BLD AUTO: 1.03 X10(3)/MCL (ref 0.1–1.3)
MONOCYTES NFR BLD AUTO: 11.4 %
NEUTROPHILS # BLD AUTO: 7.12 X10(3)/MCL (ref 2.1–9.2)
NEUTROPHILS NFR BLD AUTO: 78.7 %
NRBC BLD AUTO-RTO: 0 %
PLATELET # BLD AUTO: 148 X10(3)/MCL (ref 130–400)
PMV BLD AUTO: 10 FL (ref 7.4–10.4)
POTASSIUM SERPL-SCNC: 4.5 MMOL/L (ref 3.5–5.1)
RBC # BLD AUTO: 3.51 X10(6)/MCL (ref 4.7–6.1)
SODIUM SERPL-SCNC: 136 MMOL/L (ref 136–145)
WBC # SPEC AUTO: 9.05 X10(3)/MCL (ref 4.5–11.5)

## 2023-11-15 PROCEDURE — 25000003 PHARM REV CODE 250: Performed by: COLON & RECTAL SURGERY

## 2023-11-15 PROCEDURE — 11000001 HC ACUTE MED/SURG PRIVATE ROOM

## 2023-11-15 PROCEDURE — 80048 BASIC METABOLIC PNL TOTAL CA: CPT | Performed by: COLON & RECTAL SURGERY

## 2023-11-15 PROCEDURE — 63600175 PHARM REV CODE 636 W HCPCS: Performed by: COLON & RECTAL SURGERY

## 2023-11-15 PROCEDURE — 85025 COMPLETE CBC W/AUTO DIFF WBC: CPT | Performed by: COLON & RECTAL SURGERY

## 2023-11-15 RX ORDER — HYDRALAZINE HYDROCHLORIDE 20 MG/ML
10 INJECTION INTRAMUSCULAR; INTRAVENOUS ONCE
Status: COMPLETED | OUTPATIENT
Start: 2023-11-15 | End: 2023-11-15

## 2023-11-15 RX ORDER — CALCIUM CARBONATE 200(500)MG
500 TABLET,CHEWABLE ORAL 3 TIMES DAILY PRN
Status: DISCONTINUED | OUTPATIENT
Start: 2023-11-15 | End: 2023-11-16 | Stop reason: HOSPADM

## 2023-11-15 RX ORDER — METHOCARBAMOL 750 MG/1
750 TABLET, FILM COATED ORAL 3 TIMES DAILY
Status: DISCONTINUED | OUTPATIENT
Start: 2023-11-15 | End: 2023-11-16 | Stop reason: HOSPADM

## 2023-11-15 RX ORDER — LISINOPRIL 20 MG/1
40 TABLET ORAL DAILY
Status: DISCONTINUED | OUTPATIENT
Start: 2023-11-15 | End: 2023-11-16 | Stop reason: HOSPADM

## 2023-11-15 RX ADMIN — MUPIROCIN: 20 OINTMENT TOPICAL at 08:11

## 2023-11-15 RX ADMIN — KETOROLAC TROMETHAMINE 15 MG: 30 INJECTION, SOLUTION INTRAMUSCULAR; INTRAVENOUS at 12:11

## 2023-11-15 RX ADMIN — METHOCARBAMOL 750 MG: 750 TABLET ORAL at 04:11

## 2023-11-15 RX ADMIN — KETOROLAC TROMETHAMINE 15 MG: 30 INJECTION, SOLUTION INTRAMUSCULAR; INTRAVENOUS at 05:11

## 2023-11-15 RX ADMIN — HYDRALAZINE HYDROCHLORIDE 10 MG: 20 INJECTION INTRAMUSCULAR; INTRAVENOUS at 10:11

## 2023-11-15 RX ADMIN — LISINOPRIL 40 MG: 20 TABLET ORAL at 10:11

## 2023-11-15 RX ADMIN — DULOXETINE HYDROCHLORIDE 60 MG: 30 CAPSULE, DELAYED RELEASE ORAL at 08:11

## 2023-11-15 RX ADMIN — SODIUM CHLORIDE, POTASSIUM CHLORIDE, SODIUM LACTATE AND CALCIUM CHLORIDE: 600; 310; 30; 20 INJECTION, SOLUTION INTRAVENOUS at 01:11

## 2023-11-15 RX ADMIN — LAMOTRIGINE 100 MG: 100 TABLET ORAL at 08:11

## 2023-11-15 RX ADMIN — TAMSULOSIN HYDROCHLORIDE 0.4 MG: 0.4 CAPSULE ORAL at 08:11

## 2023-11-15 RX ADMIN — OXYCODONE HYDROCHLORIDE 5 MG: 5 TABLET ORAL at 10:11

## 2023-11-15 RX ADMIN — KETOROLAC TROMETHAMINE 15 MG: 30 INJECTION, SOLUTION INTRAMUSCULAR; INTRAVENOUS at 11:11

## 2023-11-15 RX ADMIN — CALCIUM CARBONATE (ANTACID) CHEW TAB 500 MG 500 MG: 500 CHEW TAB at 08:11

## 2023-11-15 RX ADMIN — KETOROLAC TROMETHAMINE 15 MG: 30 INJECTION, SOLUTION INTRAMUSCULAR; INTRAVENOUS at 06:11

## 2023-11-15 RX ADMIN — METHOCARBAMOL 750 MG: 750 TABLET ORAL at 08:11

## 2023-11-15 NOTE — ANESTHESIA POSTPROCEDURE EVALUATION
Anesthesia Post Evaluation    Patient: Kevin Hurley    Procedure(s) Performed: Procedure(s) (LRB):  COLECTOMY, RIGHT, LAPAROSCOPIC (N/A)    Final Anesthesia Type: general      Patient location during evaluation: PACU  Patient participation: Yes- Able to Participate  Level of consciousness: awake and alert and oriented  Post-procedure vital signs: reviewed and stable  Pain management: adequate  Airway patency: patent  DAVID mitigation strategies: Verification of full reversal of neuromuscular block  PONV status at discharge: No PONV  Anesthetic complications: no      Cardiovascular status: blood pressure returned to baseline and stable  Respiratory status: spontaneous ventilation and unassisted  Hydration status: euvolemic  Follow-up not needed.  Comments: [facility]          Vitals Value Taken Time   /87 11/14/23 1200   Temp 36.6 °C (97.9 °F) 11/14/23 0956   Pulse 83 11/14/23 1200   Resp 13 11/14/23 1200   SpO2 97 % 11/14/23 1200         Event Time   Out of Recovery 17:02:00         Pain/Danie Score: Pain Rating Prior to Med Admin: 7 (11/15/2023 12:49 PM)  Pain Rating Post Med Admin: 0 (11/15/2023  1:19 PM)  Danie Score: 10 (11/14/2023  5:30 PM)

## 2023-11-15 NOTE — PROGRESS NOTES
Colon & Rectal Surgery Progress Note    Post Op Day 1     Subjective:  Sitting in the chair  He c/o sharp spasms  Tolerating clear liquids  Reports flatus and liquid BM    Objective:  Temp:  [97.3 °F (36.3 °C)-99 °F (37.2 °C)]   Pulse:  []   Resp:  [8-21]   BP: (109-169)/(69-99)   SpO2:  [90 %-98 %]     Physical Exam:  NAD  Regular rate and rhythm  Non-labored respirations  Abdomen soft, appropriate   SCDs in place      Intake/Output Summary (Last 24 hours) at 11/15/2023 0953  Last data filed at 11/15/2023 0500  Gross per 24 hour   Intake 1080 ml   Output 1200 ml   Net -120 ml       Recent Labs     11/15/23  0512   WBC 9.05   HGB 12.5*   HCT 34.4*         K 4.5   CO2 26   BUN 18.6   CREATININE 1.39*   CALCIUM 8.4*       Assessment/Plan  - add Robaxin  - full liquids  - d/c Hall  - ambulate      Kevin Montesinos MD  Colon and Rectal Surgery

## 2023-11-15 NOTE — PLAN OF CARE
11/15/23 1114   Discharge Assessment   Assessment Type Discharge Planning Assessment   Confirmed/corrected address, phone number and insurance Yes   Confirmed Demographics Correct on Facesheet  (mailing address)   Source of Information patient   Communicated RACHEL with patient/caregiver Date not available/Unable to determine   Reason For Admission s/p colectomy   People in Home spouse  (Pt lives with his wife, Norma in a 2 story home with no steps to enter. Pt reports he can live on the first floor)   Do you expect to return to your current living situation? Yes   Do you have help at home or someone to help you manage your care at home? Yes   Who are your caregiver(s) and their phone number(s)? pt's wifeNorma   Prior to hospitilization cognitive status: Alert/Oriented   Current cognitive status: Alert/Oriented   Walking or Climbing Stairs   (independent with mobility)   Home Layout Able to live on 1st floor   Equipment Currently Used at Home none   Readmission within 30 days? No   Patient currently being followed by outpatient case management? No   Do you currently have service(s) that help you manage your care at home? No   Who is going to help you get home at discharge? pt's wifeNorma   How do you get to doctors appointments? car, drives self   Are you on dialysis? No   DME Needed Upon Discharge  none   Discharge Plan discussed with: Patient   Transition of Care Barriers None   Discharge Plan A Home with family   Discharge Plan B Home with family   Financial Resource Strain   How hard is it for you to pay for the very basics like food, housing, medical care, and heating? Not hard   Housing Stability   In the last 12 months, was there a time when you were not able to pay the mortgage or rent on time? N   Transportation Needs   In the past 12 months, has lack of transportation kept you from medical appointments or from getting medications? no   Food Insecurity   Within the past 12 months, you worried that  your food would run out before you got the money to buy more. Never true   OTHER   Name(s) of People in Home pt's wife, Norma     Pt's PCP is Dr Zhou who is located in [place]. Pt's  is his dgtr, Georgette ([phone]). Pt has never had HH services. Pt's local pharmacy would be [facility] and his mail order [facility]. 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 Pt has no dc needs at this time.

## 2023-11-16 VITALS
TEMPERATURE: 98 F | HEART RATE: 85 BPM | HEIGHT: 68 IN | OXYGEN SATURATION: 96 % | DIASTOLIC BLOOD PRESSURE: 79 MMHG | WEIGHT: 223 LBS | RESPIRATION RATE: 16 BRPM | SYSTOLIC BLOOD PRESSURE: 126 MMHG | BODY MASS INDEX: 33.8 KG/M2

## 2023-11-16 PROBLEM — K63.5 CECAL POLYP: Status: RESOLVED | Noted: 2023-11-14 | Resolved: 2023-11-16

## 2023-11-16 LAB — PSYCHE PATHOLOGY RESULT: NORMAL

## 2023-11-16 PROCEDURE — 63600175 PHARM REV CODE 636 W HCPCS: Performed by: COLON & RECTAL SURGERY

## 2023-11-16 PROCEDURE — 94761 N-INVAS EAR/PLS OXIMETRY MLT: CPT

## 2023-11-16 PROCEDURE — 25000003 PHARM REV CODE 250: Performed by: COLON & RECTAL SURGERY

## 2023-11-16 PROCEDURE — 99900035 HC TECH TIME PER 15 MIN (STAT)

## 2023-11-16 RX ADMIN — METHOCARBAMOL 750 MG: 750 TABLET ORAL at 03:11

## 2023-11-16 RX ADMIN — MUPIROCIN: 20 OINTMENT TOPICAL at 09:11

## 2023-11-16 RX ADMIN — LISINOPRIL 40 MG: 20 TABLET ORAL at 09:11

## 2023-11-16 RX ADMIN — LAMOTRIGINE 100 MG: 100 TABLET ORAL at 09:11

## 2023-11-16 RX ADMIN — KETOROLAC TROMETHAMINE 15 MG: 30 INJECTION, SOLUTION INTRAMUSCULAR; INTRAVENOUS at 12:11

## 2023-11-16 RX ADMIN — METHOCARBAMOL 750 MG: 750 TABLET ORAL at 09:11

## 2023-11-16 RX ADMIN — SODIUM CHLORIDE, POTASSIUM CHLORIDE, SODIUM LACTATE AND CALCIUM CHLORIDE: 600; 310; 30; 20 INJECTION, SOLUTION INTRAVENOUS at 02:11

## 2023-11-16 RX ADMIN — DULOXETINE HYDROCHLORIDE 60 MG: 30 CAPSULE, DELAYED RELEASE ORAL at 09:11

## 2023-11-16 RX ADMIN — TAMSULOSIN HYDROCHLORIDE 0.4 MG: 0.4 CAPSULE ORAL at 09:11

## 2023-11-16 RX ADMIN — KETOROLAC TROMETHAMINE 15 MG: 30 INJECTION, SOLUTION INTRAMUSCULAR; INTRAVENOUS at 05:11

## 2023-11-16 NOTE — PROGRESS NOTES
Colon & Rectal Surgery Progress Note    Post Op Day 2     Subjective:  Doing better today  Pain controlled  Tolerating clears  Reports flatus and Bms  Urinating well    Objective:  Temp:  [97.7 °F (36.5 °C)-98.3 °F (36.8 °C)]   Pulse:  [71-83]   Resp:  [18-20]   BP: (115-150)/(65-78)   SpO2:  [93 %-96 %]     Physical Exam:  NAD  Regular rate and rhythm  Non-labored respirations  Abdomen soft, appropriate   SCDs in place      Intake/Output Summary (Last 24 hours) at 11/16/2023 0754  Last data filed at 11/16/2023 0530  Gross per 24 hour   Intake 1802.94 ml   Output 600 ml   Net 1202.94 ml       Recent Labs     11/15/23  0512   WBC 9.05   HGB 12.5*   HCT 34.4*         K 4.5   CO2 26   BUN 18.6   CREATININE 1.39*   CALCIUM 8.4*       Assessment/Plan  - advance diet, likely home later today      Kevin Montesinos MD  Colon and Rectal Surgery

## 2023-11-18 NOTE — HOSPITAL COURSE
Patient was admitted through day surgery on 11/14/2023 and underwent a laparoscopic right colectomy without complication.  Please see operative note for full details.  Postoperatively the patient was admitted to the general surgery floor.  Clear liquids diet was initiated and slowly advanced as tolerated.  His postoperative course was uneventful and uncomplicated.  Hall catheter was removed on postoperative day 1 and he was able to void without difficulty.  By postoperative day 2, he would regained bowel function.  He was tolerating a diet, ambulatory, and afebrile.  His abdominal exam was appropriate in his pain was well controlled without narcotics.  Therefore he was deemed stable for discharge home.  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

## 2023-11-18 NOTE — DISCHARGE SUMMARY
[facility]  8th Floor Med Surg  Colorectal Surgery  Discharge Summary      Patient Name: Kevin Hurley  MRN: [id number]  Admission Date: 11/14/2023  Hospital Length of Stay: 2 days  Discharge Date and Time: 11/16/2023  4:30 PM  Attending Physician: No att. providers found   Discharging Provider: Kevin Montesinos MD  Primary Care Provider: Jez Christopher MD     HPI:  No notes on file    Procedure(s) (LRB):  COLECTOMY, RIGHT, LAPAROSCOPIC (N/A)     Hospital Course:  Patient was admitted through day surgery on 11/14/2023 and underwent a laparoscopic right colectomy without complication.  Please see operative note for full details.  Postoperatively the patient was admitted to the general surgery floor.  Clear liquids diet was initiated and slowly advanced as tolerated.  His postoperative course was uneventful and uncomplicated.  Hall catheter was removed on postoperative day 1 and he was able to void without difficulty.  By postoperative day 2, he would regained bowel function.  He was tolerating a diet, ambulatory, and afebrile.  His abdominal exam was appropriate in his pain was well controlled without narcotics.  Therefore he was deemed stable for discharge home.  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    Goals of Care Treatment Preferences:  Code Status: Full Code          Significant Diagnostic Studies:   Specimen (24h ago, onward)      None            Pending Diagnostic Studies:       None          Final Active Diagnoses:      Problems Resolved During this Admission:    Diagnosis Date Noted Date Resolved POA    PRINCIPAL PROBLEM:  Cecal polyp [K63.5] 11/14/2023 11/16/2023 Yes      Discharged Condition: stable    Disposition: Home or Self Care    Follow Up:   Follow-up Information       Kevin Montesinos MD Follow up on 11/30/2023.    Specialty: Colon and Rectal Surgery  Why: 11:30  Contact information:  [address]  [address]  [place] [address]  [phone]                           Patient Instructions:      Diet Adult Regular     Lifting restrictions   Order Comments: No lifting >10lbs for 6 weeks     Shower on day dressing removed (No bath)     Medications:  Reconciled Home Medications:      Medication List        CONTINUE taking these medications      * DULoxetine 60 MG capsule  Commonly known as: CYMBALTA  Take 60 mg by mouth once daily.     * DULoxetine 30 MG capsule  Commonly known as: CYMBALTA  Take 30 mg by mouth once daily.     enalapril 20 MG tablet  Commonly known as: VASOTEC  Take 20 mg by mouth once daily.     febuxostat 40 mg Tab  Commonly known as: ULORIC  Take 40 mg by mouth.     lamoTRIgine 100 MG tablet  Commonly known as: LAMICTAL  Take 100 mg by mouth once daily.     methylPREDNISolone 4 mg tablet  Commonly known as: MEDROL DOSEPACK  Take by mouth.     tamsulosin 0.4 mg Cap  Commonly known as: FLOMAX  Take 1 capsule by mouth once daily.           * This list has 2 medication(s) that are the same as other medications prescribed for you. Read the directions carefully, and ask your doctor or other care provider to review them with you.                  Kevin Montesinos MD  Colorectal Surgery  [facility] - 8th Floor Med Surg

## 2023-11-30 ENCOUNTER — OFFICE VISIT (OUTPATIENT)
Dept: SURGICAL ONCOLOGY | Facility: CLINIC | Age: 73
End: 2023-11-30
Payer: MEDICARE

## 2023-11-30 VITALS
DIASTOLIC BLOOD PRESSURE: 66 MMHG | BODY MASS INDEX: 32.92 KG/M2 | WEIGHT: 217.19 LBS | SYSTOLIC BLOOD PRESSURE: 112 MMHG | HEIGHT: 68 IN

## 2023-11-30 DIAGNOSIS — Z90.49 S/P RIGHT COLECTOMY: Primary | ICD-10-CM

## 2023-11-30 PROCEDURE — 4010F PR ACE/ARB THEARPY RXD/TAKEN: ICD-10-PCS | Mod: CPTII,S$GLB,, | Performed by: COLON & RECTAL SURGERY

## 2023-11-30 PROCEDURE — 1101F PR PT FALLS ASSESS DOC 0-1 FALLS W/OUT INJ PAST YR: ICD-10-PCS | Mod: CPTII,S$GLB,, | Performed by: COLON & RECTAL SURGERY

## 2023-11-30 PROCEDURE — 99999 PR PBB SHADOW E&M-EST. PATIENT-LVL III: ICD-10-PCS | Mod: PBBFAC,,, | Performed by: COLON & RECTAL SURGERY

## 2023-11-30 PROCEDURE — 3074F SYST BP LT 130 MM HG: CPT | Mod: CPTII,S$GLB,, | Performed by: COLON & RECTAL SURGERY

## 2023-11-30 PROCEDURE — 1159F PR MEDICATION LIST DOCUMENTED IN MEDICAL RECORD: ICD-10-PCS | Mod: CPTII,S$GLB,, | Performed by: COLON & RECTAL SURGERY

## 2023-11-30 PROCEDURE — 3288F PR FALLS RISK ASSESSMENT DOCUMENTED: ICD-10-PCS | Mod: CPTII,S$GLB,, | Performed by: COLON & RECTAL SURGERY

## 2023-11-30 PROCEDURE — 3078F DIAST BP <80 MM HG: CPT | Mod: CPTII,S$GLB,, | Performed by: COLON & RECTAL SURGERY

## 2023-11-30 PROCEDURE — 1159F MED LIST DOCD IN RCRD: CPT | Mod: CPTII,S$GLB,, | Performed by: COLON & RECTAL SURGERY

## 2023-11-30 PROCEDURE — 99024 PR POST-OP FOLLOW-UP VISIT: ICD-10-PCS | Mod: S$GLB,,, | Performed by: COLON & RECTAL SURGERY

## 2023-11-30 PROCEDURE — 1160F PR REVIEW ALL MEDS BY PRESCRIBER/CLIN PHARMACIST DOCUMENTED: ICD-10-PCS | Mod: CPTII,S$GLB,, | Performed by: COLON & RECTAL SURGERY

## 2023-11-30 PROCEDURE — 3078F PR MOST RECENT DIASTOLIC BLOOD PRESSURE < 80 MM HG: ICD-10-PCS | Mod: CPTII,S$GLB,, | Performed by: COLON & RECTAL SURGERY

## 2023-11-30 PROCEDURE — 3288F FALL RISK ASSESSMENT DOCD: CPT | Mod: CPTII,S$GLB,, | Performed by: COLON & RECTAL SURGERY

## 2023-11-30 PROCEDURE — 99999 PR PBB SHADOW E&M-EST. PATIENT-LVL III: CPT | Mod: PBBFAC,,, | Performed by: COLON & RECTAL SURGERY

## 2023-11-30 PROCEDURE — 1160F RVW MEDS BY RX/DR IN RCRD: CPT | Mod: CPTII,S$GLB,, | Performed by: COLON & RECTAL SURGERY

## 2023-11-30 PROCEDURE — 3074F PR MOST RECENT SYSTOLIC BLOOD PRESSURE < 130 MM HG: ICD-10-PCS | Mod: CPTII,S$GLB,, | Performed by: COLON & RECTAL SURGERY

## 2023-11-30 PROCEDURE — 99024 POSTOP FOLLOW-UP VISIT: CPT | Mod: S$GLB,,, | Performed by: COLON & RECTAL SURGERY

## 2023-11-30 PROCEDURE — 1101F PT FALLS ASSESS-DOCD LE1/YR: CPT | Mod: CPTII,S$GLB,, | Performed by: COLON & RECTAL SURGERY

## 2023-11-30 PROCEDURE — 4010F ACE/ARB THERAPY RXD/TAKEN: CPT | Mod: CPTII,S$GLB,, | Performed by: COLON & RECTAL SURGERY

## 2023-11-30 RX ORDER — TURMERIC 100 %
POWDER (GRAM) MISCELLANEOUS
COMMUNITY

## 2023-11-30 NOTE — PROGRESS NOTES
"   Patient ID: [id number]     HPI:     Kevin Hurley is a 73 y.o. male here today for a post op visit.  Doing very well.  No complaints.  Tolerating a diet and having bowel movements.  Denies fever, nausea, vomiting, abdominal pain, constipation.     Path - TVA with small focus of HGD    Current Outpatient Medications   Medication Instructions    DULoxetine (CYMBALTA) 60 mg, Oral, Daily    DULoxetine (CYMBALTA) 30 mg, Oral, Daily    enalapril (VASOTEC) 20 mg, Oral, Daily    febuxostat (ULORIC) 40 mg, Oral    lamoTRIgine (LAMICTAL) 100 mg, Oral, Daily    methylPREDNISolone (MEDROL DOSEPACK) 4 mg tablet Oral    tamsulosin (FLOMAX) 0.4 mg Cap 1 capsule, Oral, Daily    turmeric, bulk, 100 % Powd as directed       Patient is allergic to allopurinol, allopurinol analogues, and flagyl [metronidazole].     Patient Care Team:  Jez Christopher MD as PCP - General (Family Medicine)       Objective:     Visit Vitals  /66   Pulse (P) 76   Ht 5' 8" (1.727 m)   Wt 98.5 kg (217 lb 3.2 oz)   BMI 33.03 kg/m²       Physical Exam    General: Alert and oriented, No acute distress.  Head: Normocephalic, Atraumatic.  Eye: Sclera non-icteric.  Respiratory: Non-labored respirations, Symmetrical chest wall expansion.  Cardiac: Regular rate.  Gastrointestinal: Soft, Non-distended. Incisions healing well without SOI.   Extremities: No lower extremity edema.  Integumentary: Warm, Dry, Intact.  Neurologic: No focal deficits.      Assessment:       ICD-10-CM ICD-9-CM   1. S/P right colectomy  Z90.49 V45.89        Plan:   - Continue lifting restrictions for another month  - RTC PRN      No follow-ups on file. In addition to their scheduled follow up, the patient has also been instructed to follow up on as needed basis.     No future appointments.     Kevin Montesinos MD    "

## 2024-02-10 NOTE — NURSING
Problem: Patient Centered Care  Goal: Patient preferences are identified and integrated in the patient's plan of care  Description: Interventions:  - What would you like us to know as we care for you? Patient from [facility]  - Provide timely, complete, and accurate information to patient/family  - Incorporate patient and family knowledge, values, beliefs, and cultural backgrounds into the planning and delivery of care  - Encourage patient/family to participate in care and decision-making at the level they choose  - Honor patient and family perspectives and choices  Outcome: Progressing     Problem: Diabetes/Glucose Control  Goal: Glucose maintained within prescribed range  Description: INTERVENTIONS:  - Monitor Blood Glucose as ordered  - Assess for signs and symptoms of hyperglycemia and hypoglycemia  - Administer ordered medications to maintain glucose within target range  - Assess barriers to adequate nutritional intake and initiate nutrition consult as needed  - Instruct patient on self management of diabetes  Outcome: Progressing     Problem: Patient/Family Goals  Goal: Patient/Family Long Term Goal  Description: Patient's Long Term Goal:     Interventions:    - See additional Care Plan goals for specific interventions  Outcome: Progressing  Goal: Patient/Family Short Term Goal  Description: Patient's Short Term Goal:     Interventions:   - See additional Care Plan goals for specific interventions  Outcome: Progressing     Problem: SAFETY ADULT - FALL  Goal: Free from fall injury  Description: INTERVENTIONS:  - Assess pt frequently for physical needs  - Identify cognitive and physical deficits and behaviors that affect risk of falls.  - [place] fall precautions as indicated by assessment.  - Educate pt/family on patient safety including physical limitations  - Instruct pt to call for assistance with activity based on assessment  - Modify environment to reduce risk of injury  - Provide assistive  Pt D/C in stable condition. All f/u apts, home meds, and discharge instructions discussed. Iv's taken out. All questions answered.    devices as appropriate  - Consider OT/PT consult to assist with strengthening/mobility  - Encourage toileting schedule  Outcome: Progressing     Problem: RESPIRATORY - ADULT  Goal: Achieves optimal ventilation and oxygenation  Description: INTERVENTIONS:  - Assess for changes in respiratory status  - Assess for changes in mentation and behavior  - Position to facilitate oxygenation and minimize respiratory effort  - Oxygen supplementation based on oxygen saturation or ABGs  - Provide Smoking Cessation handout, if applicable  - Encourage broncho-pulmonary hygiene including cough, deep breathe, Incentive Spirometry  - Assess the need for suctioning and perform as needed  - Assess and instruct to report SOB or any respiratory difficulty  - Respiratory Therapy support as indicated  - Manage/alleviate anxiety  - Monitor for signs/symptoms of CO2 retention  Outcome: Progressing     Problem: SKIN/TISSUE INTEGRITY - ADULT  Goal: Skin integrity remains intact  Description: INTERVENTIONS  - Assess and document risk factors for pressure ulcer development  - Assess and document skin integrity  - Monitor for areas of redness and/or skin breakdown  - Initiate interventions, skin care algorithm/standards of care as needed  Outcome: Progressing  Goal: Oral mucous membranes remain intact  Description: INTERVENTIONS  - Assess oral mucosa and hygiene practices  - Implement preventative oral hygiene regimen  - Implement oral medicated treatments as ordered  Outcome: Progressing     Problem: MUSCULOSKELETAL - ADULT  Goal: Maintain proper alignment of affected body part  Description: INTERVENTIONS:  - Support and protect limb and body alignment per provider's orders  - Instruct and reinforce with patient and family use of appropriate assistive device and precautions (e.g. spinal or hip dislocation precautions)  Outcome: Progressing     Problem: Impaired Activities of Daily Living  Goal: Achieve highest/safest level of independence in  self care  Description: Interventions:  - Assess ability and encourage patient to participate in ADLs to maximize function  - Promote sitting position while performing ADLs such as feeding, grooming, and bathing  - Educate and encourage patient/family in tolerated functional activity level and precautions during self-care    Outcome: Progressing     Problem: Impaired Swallowing  Goal: Minimize aspiration risk  Description: Interventions:  - Patient should be alert and upright for all feedings (90 degrees preferred)  - Offer food and liquids at a slow rate  - No straws  - Encourage small bites of food and small sips of liquid  - Offer pills one at a time, crush or deliver with applesauce as needed  - Discontinue feeding and notify MD (or speech pathologist) if coughing or persistent throat clearing or wet/gurgly vocal quality is noted  Outcome: Progressing

## 2024-10-03 DIAGNOSIS — R15.2 FECAL URGENCY: Primary | ICD-10-CM

## (undated) DEVICE — RELOAD ECHELON FLEX BLU 60MM

## (undated) DEVICE — TROCAR ENDOPATH XCEL 12MM 10CM

## (undated) DEVICE — SUT 3-0 MONOCRYL PLUS PS-2

## (undated) DEVICE — DRAPE SLUSH WARMER 66X44IN

## (undated) DEVICE — SUT CTD VICRYL BR CR/SH VIL

## (undated) DEVICE — TROCAR ENDOPATH XCEL 11MM 10CM

## (undated) DEVICE — TROCAR ENDOPATH XCEL 5X100MM

## (undated) DEVICE — STAPLER ECHELON FLEX GST 60MM

## (undated) DEVICE — ELECTRODE PATIENT RETURN DISP

## (undated) DEVICE — SUT MFIL VIOL PDS II TP-1 30IN

## (undated) DEVICE — SUT VICRYL+ 27 UR-6 VIOL

## (undated) DEVICE — SHEARS HARMONIC CRVD TIP 36CM

## (undated) DEVICE — COVER MAYO STAND REINFRCD 30

## (undated) DEVICE — SOL NACL IRR 1000ML BTL

## (undated) DEVICE — HOLDER STRIP-T SELF ADH 2X10IN

## (undated) DEVICE — CLOSURE SKIN STERI STRIP 1/2X4

## (undated) DEVICE — ADHESIVE MASTISOL VIAL 48/BX

## (undated) DEVICE — CHLORAPREP W TINT 26ML APPL

## (undated) DEVICE — SCISSOR CURVED ENDOPATH 5MM

## (undated) DEVICE — SOL IRRI STRL WATER 1000ML

## (undated) DEVICE — IRRIGATOR HYDRO-SURG PLUS 5MM

## (undated) DEVICE — GLOVE PROTEXIS LTX MICRO  7.5

## (undated) DEVICE — NDL 20GX1-1/2IN IB

## (undated) DEVICE — GLOVE PROTEXIS BLUE LATEX 8

## (undated) DEVICE — KIT SURGICAL TURNOVER

## (undated) DEVICE — RELOAD ECHELON FLEX WHT 60MM

## (undated) DEVICE — SYR 10CC LUER LOCK

## (undated) DEVICE — TRAY CATH FOL SIL URIMTR 16FR

## (undated) DEVICE — ADHESIVE DERMABOND ADVANCED

## (undated) DEVICE — DRESSING TELFA + BARR 4X6IN

## (undated) DEVICE — CONTAINER SPECIMEN SCREW 4OZ

## (undated) DEVICE — APPLICATOR STRL COT 2INNR 6IN

## (undated) DEVICE — CUTTER PROXIMATE BLUE 75MM

## (undated) DEVICE — KIT GEN LAPAROSCOPY LAFAYETTE

## (undated) DEVICE — COVER PROBE US 5.5X58L NON LTX

## (undated) DEVICE — Device

## (undated) DEVICE — CANNULA ENDOPATH XCEL 5X100MM

## (undated) DEVICE — STAPLER ECHELON FLEX 60MM 44CM

## (undated) DEVICE — PAD PINK TRENDELENBURG POS XL

## (undated) DEVICE — SUT MCRYL PLUS 4-0 PS2 27IN